# Patient Record
Sex: MALE | Race: WHITE | NOT HISPANIC OR LATINO | Employment: FULL TIME | URBAN - METROPOLITAN AREA
[De-identification: names, ages, dates, MRNs, and addresses within clinical notes are randomized per-mention and may not be internally consistent; named-entity substitution may affect disease eponyms.]

---

## 2017-12-10 ENCOUNTER — OFFICE VISIT (OUTPATIENT)
Dept: URGENT CARE | Facility: CLINIC | Age: 22
End: 2017-12-10
Payer: COMMERCIAL

## 2017-12-10 VITALS
OXYGEN SATURATION: 99 % | HEART RATE: 98 BPM | DIASTOLIC BLOOD PRESSURE: 98 MMHG | SYSTOLIC BLOOD PRESSURE: 143 MMHG | TEMPERATURE: 100 F

## 2017-12-10 DIAGNOSIS — H10.021 OTHER MUCOPURULENT CONJUNCTIVITIS OF RIGHT EYE: Primary | ICD-10-CM

## 2017-12-10 PROCEDURE — 99213 OFFICE O/P EST LOW 20 MIN: CPT | Mod: S$GLB,,, | Performed by: INTERNAL MEDICINE

## 2017-12-10 RX ORDER — POLYMYXIN B SULFATE AND TRIMETHOPRIM 1; 10000 MG/ML; [USP'U]/ML
1 SOLUTION OPHTHALMIC EVERY 4 HOURS
Qty: 10 ML | Refills: 0 | Status: SHIPPED | OUTPATIENT
Start: 2017-12-10

## 2017-12-10 NOTE — PROGRESS NOTES
Subjective:       Patient ID: Jacob Rebolledo is a 22 y.o. male.    Vitals:  tympanic temperature is 99.5 °F (37.5 °C). His blood pressure is 143/98 (abnormal) and his pulse is 98. His oxygen saturation is 99%.     Chief Complaint: Eye Problem    This is a 22 y.o. male with Past Medical History:  No date: Allergy   who presents today with a chief complaint of right eye swelling and redness.      Eye Problem    The right eye is affected. This is a new problem. The current episode started in the past 7 days. The problem occurs constantly. The problem has been gradually worsening. There was no injury mechanism. The pain is at a severity of 5/10. The pain is moderate. There is known exposure to pink eye. He does not wear contacts. Associated symptoms include blurred vision, an eye discharge, eye redness, a foreign body sensation, itching and photophobia. Pertinent negatives include no fever or nausea. He has tried eye drops and water for the symptoms. The treatment provided no relief.   Patient exposed to nephew with pink eye 5 days ago.  He noted swelling and erythema of right eye today.   Review of Systems   Constitution: Negative for fever.   Eyes: Positive for blurred vision, discharge, itching, photophobia and redness.   Gastrointestinal: Negative for nausea.       Objective:      Physical Exam   Constitutional: He appears well-developed and well-nourished.   Eyes:   Bulbar and tarsal conjunctiva swollen and red.    Nursing note and vitals reviewed.      Assessment:       1.  Conjunctivitis OD  Plan:         1.  Polytrim Eye drops

## 2017-12-10 NOTE — PATIENT INSTRUCTIONS
Conjunctivitis, Nonspecific    The membrane that covers the white part of your eye (the conjunctiva) is inflamed. Inflammation happens when your body responds to an injury, allergic reaction, infection, or illness. Symptoms of inflammation in the eye may include redness, irritation, itching, swelling, or burning. These symptoms should go away within the next 24 hours. Conjunctivitis may be related to a particle that was in your eye. If so, it may wash out with your tears or irrigation treatment. Being exposed to liquid chemicals or fumes may also cause this reaction.   Home care  · Apply a cold pack (ice in a plastic bag, wrapped in a towel) over the eye for 20 minutes at a time. This will reduce pain.  · Artificial tears may be prescribed to reduce irritation or redness.  These should be used 3 to 4 times a day.  · You may use acetaminophen or ibuprofen to control pain, unless another medicine was prescribed.(Note: If you have chronic liver or kidney disease, or if you have ever had a stomach ulcer or gastrointestinal bleeding, talk with your healthcare provider before using these medicines.)  Follow-up care  Follow up with your healthcare provider, or as advised.  When to seek medical advice  Call your healthcare provider right away if any of these occur:  · Increased eyelid swelling  · Increased eye pain  · Increased redness or drainage from the eye  · Increased blurry vision or increased sensitivity to light  · Failure of normal vision to return within 24 to 48 hours  Date Last Reviewed: 6/14/2015  © 2278-3574 [employer]. [address] [place] [address], [place] [address]. All rights reserved. This information is not intended as a substitute for professional medical care. Always follow your healthcare professional's instructions.

## 2021-12-25 PROCEDURE — 80047 BASIC METABLC PNL IONIZED CA: CPT

## 2021-12-25 PROCEDURE — 99284 EMERGENCY DEPT VISIT MOD MDM: CPT | Mod: ,,, | Performed by: EMERGENCY MEDICINE

## 2021-12-25 PROCEDURE — 99284 PR EMERGENCY DEPT VISIT,LEVEL IV: ICD-10-PCS | Mod: ,,, | Performed by: EMERGENCY MEDICINE

## 2021-12-25 PROCEDURE — 99284 EMERGENCY DEPT VISIT MOD MDM: CPT | Mod: 25

## 2021-12-26 ENCOUNTER — HOSPITAL ENCOUNTER (EMERGENCY)
Facility: HOSPITAL | Age: 26
Discharge: HOME | End: 2021-12-26
Attending: EMERGENCY MEDICINE
Payer: COMMERCIAL

## 2021-12-26 VITALS
BODY MASS INDEX: 41.75 KG/M2 | WEIGHT: 315 LBS | SYSTOLIC BLOOD PRESSURE: 139 MMHG | OXYGEN SATURATION: 100 % | RESPIRATION RATE: 20 BRPM | DIASTOLIC BLOOD PRESSURE: 65 MMHG | HEIGHT: 73 IN | HEART RATE: 88 BPM | TEMPERATURE: 99 F

## 2021-12-26 DIAGNOSIS — R55 PRE-SYNCOPE: ICD-10-CM

## 2021-12-26 DIAGNOSIS — R55 NEAR SYNCOPE: Primary | ICD-10-CM

## 2021-12-26 PROCEDURE — 93005 ELECTROCARDIOGRAM TRACING: CPT

## 2021-12-26 PROCEDURE — 93010 ELECTROCARDIOGRAM REPORT: CPT | Mod: ,,, | Performed by: INTERNAL MEDICINE

## 2021-12-26 PROCEDURE — 93010 EKG 12-LEAD: ICD-10-PCS | Mod: ,,, | Performed by: INTERNAL MEDICINE

## 2021-12-26 NOTE — ED TRIAGE NOTES
"Jacob Rebolledo, a 26 y.o. male presents to the ED w/ complaint of "black out". Pt mother states pt was drinking when he passed out, unknown LOC, family states pt eyes were open and glazed over for no more than 30 seconds. Pt does not remember event. Denies Sob, H/A, chest pain, dizziness, hx of seizures.     Triage note:  Chief Complaint   Patient presents with    Loss of Consciousness     Was drinking and "blacked out" for about thirty seconds. His eyes were open but was not responding and he started chocking on his drink. No history of seizures.      Review of patient's allergies indicates:  No Known Allergies  Past Medical History:   Diagnosis Date    Allergy      "

## 2021-12-26 NOTE — ED PROVIDER NOTES
"Encounter Date: 12/25/2021       History     Chief Complaint   Patient presents with    Loss of Consciousness     Was drinking and "blacked out" for about thirty seconds. His eyes were open but was not responding and he started chocking on his drink. No history of seizures.      HPI Jacob Rebolledo 26 y.o. male with with no significant past medical history who presented to the ED with family complaining of brief episode of "black out" Family denies that patient passed out and states that for approximately 30 seconds while laughing and drinking he started choking on his drink. His eyes remained opened but had "blank stare." Patient reports hearing mother tell sister to call 911. Patient returned to baseline after the brief episode. No bowel or bladder incontinence.  No previous symptoms in the past.  No headache, dizziness, chest pain, difficulty breathing, abdominal pain, nausea, vomiting. No history of seizures. Patient reports that he feels "fine" and is at his baseline.     Review of patient's allergies indicates:  No Known Allergies  Past Medical History:   Diagnosis Date    Allergy      History reviewed. No pertinent surgical history.  Family History   Problem Relation Age of Onset    Diabetes Mother     Hyperlipidemia Father      Social History     Tobacco Use    Smoking status: Never Smoker    Smokeless tobacco: Never Used   Substance Use Topics    Alcohol use: Yes     Comment: occassionally    Drug use: No     Review of Systems   Constitutional: Negative for chills and fever.   HENT: Negative for rhinorrhea and sore throat.    Eyes: Negative for discharge, redness and visual disturbance.   Respiratory: Negative for cough and shortness of breath.    Cardiovascular: Negative for chest pain and leg swelling.   Gastrointestinal: Negative for abdominal pain, nausea and vomiting.   Genitourinary: Negative for frequency and urgency.   Musculoskeletal: Negative for back pain and gait problem.   Skin: Negative for " rash.   Neurological: Negative for dizziness, syncope and headaches.   Hematological: Does not bruise/bleed easily.       Physical Exam     Initial Vitals [12/25/21 2333]   BP Pulse Resp Temp SpO2   (!) 175/103 100 20 98.5 °F (36.9 °C) 98 %      MAP       --         Physical Exam    Nursing note and vitals reviewed.  Constitutional: He appears well-developed and well-nourished.   HENT:   Head: Normocephalic and atraumatic.   Eyes: Conjunctivae and EOM are normal. Pupils are equal, round, and reactive to light.   Neck: Neck supple.   Normal range of motion.  Cardiovascular: Normal rate, regular rhythm, normal heart sounds and intact distal pulses. Exam reveals no gallop and no friction rub.    No murmur heard.  Pulmonary/Chest: Breath sounds normal. No respiratory distress. He has no wheezes. He has no rhonchi. He has no rales.   Abdominal: Abdomen is soft. Bowel sounds are normal. He exhibits no distension. There is no abdominal tenderness. There is no rebound and no guarding.   Musculoskeletal:         General: No edema. Normal range of motion.      Cervical back: Normal range of motion and neck supple.     Neurological: He is alert and oriented to person, place, and time. He has normal strength. GCS score is 15. GCS eye subscore is 4. GCS verbal subscore is 5. GCS motor subscore is 6.   Ambulates with steady gait  CN II-XII intact  Finger to nose intact  No pronator drift  5/5 strength in bilateral upper and lower extremities   Skin: Skin is warm and dry.         ED Course   Procedures  Labs Reviewed   HIV 1 / 2 ANTIBODY   HEPATITIS C ANTIBODY   ISTAT CHEM8     EKG Readings: (Independently Interpreted)   Initial Reading: No STEMI. Rhythm: Normal Sinus Rhythm. Ectopy: No Ectopy. Conduction: Normal. Axis: Normal.       Imaging Results    None          Medications - No data to display                       Clinical Impression:

## 2021-12-26 NOTE — ED NOTES
I-STAT Chem-8+ Results:   Value Reference Range   Sodium 139 136-145 mmol/L   Potassium  5.2 3.5-5.1 mmol/L   Chloride 106  mmol/L   Ionized Calcium 1.13 1.06-1.42 mmol/L   CO2 (measured) 23 23-29 mmol/L   Glucose 149  mg/dL   BUN 21 6-30 mg/dL   Creatinine 0.9 0.5-1.4 mg/dL   Hematocrit 38 36-54%

## 2021-12-26 NOTE — ED PROVIDER NOTES
"Encounter Date: 12/25/2021       History     Chief Complaint   Patient presents with    Loss of Consciousness     Was drinking and "blacked out" for about thirty seconds. His eyes were open but was not responding and he started chocking on his drink. No history of seizures.      HPI Jacob Rebolledo 26 y.o. male with with no significant past medical history who presented emergency department after having an episode of "black out" Family denies that patient passed out and states that for approximately 30 seconds while laughing and drinking he started choking on his drink. His eyes remained opened but had "blank stare." Patient reports hearing mother tell sister to call 911. Patient returned to baseline after the brief episode. No bowel or bladder incontinence.  No previous symptoms in the past.  No headache, dizziness, chest pain, difficulty breathing, abdominal pain, nausea, vomiting. No history of seizures. Patient reports that he feels "fine" and is at his baseline.     Review of patient's allergies indicates:  No Known Allergies  Past Medical History:   Diagnosis Date    Allergy      History reviewed. No pertinent surgical history.  Family History   Problem Relation Age of Onset    Diabetes Mother     Hyperlipidemia Father      Social History     Tobacco Use    Smoking status: Never Smoker    Smokeless tobacco: Never Used   Substance Use Topics    Alcohol use: Yes     Comment: occassionally    Drug use: No     Review of Systems   Constitutional: Negative for chills and fever.   HENT: Negative for rhinorrhea and sore throat.    Eyes: Negative for pain and visual disturbance.   Respiratory: Negative for cough and shortness of breath.    Cardiovascular: Negative for chest pain and leg swelling.   Gastrointestinal: Negative for abdominal pain, nausea and vomiting.   Genitourinary: Negative for dysuria and urgency.   Musculoskeletal: Negative for arthralgias and back pain.   Skin: Negative for rash and wound. "   Neurological: Negative for dizziness, weakness and numbness.       Physical Exam     Initial Vitals [12/25/21 2333]   BP Pulse Resp Temp SpO2   (!) 175/103 100 20 98.5 °F (36.9 °C) 98 %      MAP       --         Physical Exam    Nursing note and vitals reviewed.  Constitutional: He appears well-developed and well-nourished.   HENT:   Head: Normocephalic and atraumatic.   Eyes: Conjunctivae and EOM are normal. Pupils are equal, round, and reactive to light.   Neck: Neck supple.   Normal range of motion.  Cardiovascular: Normal rate, regular rhythm, normal heart sounds and intact distal pulses. Exam reveals no friction rub.    No murmur heard.  Pulmonary/Chest: Breath sounds normal. No respiratory distress. He has no wheezes. He has no rhonchi. He has no rales. He exhibits no tenderness.   Abdominal: Abdomen is soft. Bowel sounds are normal. He exhibits no distension. There is no abdominal tenderness. There is no rebound and no guarding.   Musculoskeletal:         General: No edema. Normal range of motion.      Cervical back: Normal range of motion and neck supple.     Neurological: He is alert and oriented to person, place, and time.   5/5 strength in bilateral upper and lower extremities  Finger-to-nose intact.  No pronator drift.  Light touch sensation intact.  Cranial nerves 2-12 intact  Ambulates with steady gait.   Skin: Skin is warm and dry.         ED Course   Procedures  Labs Reviewed - No data to display  EKG Readings: (Independently Interpreted)   Initial Reading: No STEMI. Rhythm: Normal Sinus Rhythm. Heart Rate: 87. Ectopy: No Ectopy. Conduction: Normal. Axis: Normal.     ECG Results          EKG 12-lead (In process)  Result time 12/26/21 08:54:40    In process by Interface, Lab In [facility] (12/26/21 08:54:40)                 Narrative:    Test Reason :     Vent. Rate : 087 BPM     Atrial Rate : 087 BPM     P-R Int : 158 ms          QRS Dur : 082 ms      QT Int : 340 ms       P-R-T Axes : 027 031 023  "degrees     QTc Int : 409 ms    Normal sinus rhythm  Normal ECG  No previous ECGs available    Referred By: [email or web address]   SELF           Confirmed By:                             Imaging Results    None          Medications - No data to display  Medical Decision Making:   History:   I obtained history from: someone other than patient.       <> Summary of History: Patient's family at bedside  Initial Assessment:   Jacob Rebolledo 26 y.o. male with episode of "blacking out".     Differential Diagnosis:   Vasovagal pre-syncope, electrolyte abnormality, arrhythmia.  Low suspicion for intracranial hemorrhages patient did not hit head, is at his baseline, and has non-focal neurologic exam.  Also low suspicion for seizures as patient immediately returned to baseline, there was no seizure-like activity demonstrated, there was no confused state after event.  Independently Interpreted Test(s):   I have ordered and independently interpreted EKG Reading(s) - see prior notes  Clinical Tests:   Lab Tests: Ordered and Reviewed       <> Summary of Lab: I-STAT Chem 8 within normal limits. EKG without arrhythmia or signs of ischemia.   ED Management:  Discussed with patient results of EKG and lab work.  Patient observed in emergency department without further symptoms.  Discussed follow-up with primary care within week.  Strict return precautions provided patient family at bedside verbalized understanding and agreement with plan of care.  Patient stable for discharge in outpatient follow-up.    Emerson Combs MD PGY2  [facility]                          Clinical Impression:   Final diagnoses:  [R55] Pre-syncope  [R55] Near syncope (Primary)          ED Disposition Condition    Discharge Stable        ED Prescriptions     None        Follow-up Information     Follow up With Specialties Details Why Contact Info    Primary Care Provider    Please follow up with your primary care provider within one week regarding today's emergency " department visit. If you do not have a primary care provider, please see list below.    [facility] Emergency [facility] Emergency Medicine  For loss of consciouness, seizure or seizure like activity, chest pain, difficulty breathing, severe abdominal pain, weakness, slurred speech, fever, headache, visual changes, any new or concerning signs or symptoms. [address]  [facility] [id number]  [phone]           Emerson Combs MD  Resident  12/26/21 0916

## 2021-12-27 LAB
BUN SERPL-MCNC: 21 MG/DL (ref 6–30)
CHLORIDE SERPL-SCNC: 106 MMOL/L (ref 95–110)
CREAT SERPL-MCNC: 0.9 MG/DL (ref 0.5–1.4)
GLUCOSE SERPL-MCNC: 149 MG/DL (ref 70–110)
HCT VFR BLD CALC: 38 %PCV (ref 36–54)
POC IONIZED CALCIUM: 1.13 MMOL/L (ref 1.06–1.42)
POC TCO2 (MEASURED): 23 MMOL/L (ref 23–29)
POTASSIUM BLD-SCNC: 5.2 MMOL/L (ref 3.5–5.1)
SAMPLE: ABNORMAL
SODIUM BLD-SCNC: 139 MMOL/L (ref 136–145)